# Patient Record
Sex: FEMALE | Race: WHITE | Employment: FULL TIME | RURAL
[De-identification: names, ages, dates, MRNs, and addresses within clinical notes are randomized per-mention and may not be internally consistent; named-entity substitution may affect disease eponyms.]

---

## 2022-02-28 ENCOUNTER — HOSPITAL ENCOUNTER (EMERGENCY)
Age: 62
Discharge: HOME | End: 2022-02-28
Attending: EMERGENCY MEDICINE
Payer: COMMERCIAL

## 2022-02-28 VITALS
WEIGHT: 158 LBS | HEIGHT: 65 IN | TEMPERATURE: 98.8 F | DIASTOLIC BLOOD PRESSURE: 68 MMHG | RESPIRATION RATE: 16 BRPM | OXYGEN SATURATION: 100 % | HEART RATE: 80 BPM | BODY MASS INDEX: 26.33 KG/M2 | SYSTOLIC BLOOD PRESSURE: 144 MMHG

## 2022-02-28 DIAGNOSIS — S00.411A ABRASION OF RIGHT EAR CANAL, INITIAL ENCOUNTER: Primary | ICD-10-CM

## 2022-02-28 PROCEDURE — 99282 EMERGENCY DEPT VISIT SF MDM: CPT

## 2022-02-28 PROCEDURE — 74011000250 HC RX REV CODE- 250: Performed by: EMERGENCY MEDICINE

## 2022-02-28 RX ORDER — CLOPIDOGREL BISULFATE 75 MG/1
TABLET ORAL
COMMUNITY

## 2022-02-28 RX ORDER — ROSUVASTATIN CALCIUM 5 MG/1
5 TABLET, COATED ORAL
COMMUNITY

## 2022-02-28 RX ORDER — LOSARTAN POTASSIUM 50 MG/1
TABLET ORAL DAILY
COMMUNITY

## 2022-02-28 RX ORDER — SILVER NITRATE 38.21; 12.74 MG/1; MG/1
1 STICK TOPICAL
Status: COMPLETED | OUTPATIENT
Start: 2022-02-28 | End: 2022-02-28

## 2022-02-28 RX ORDER — VENLAFAXINE 75 MG/1
75 TABLET ORAL DAILY
COMMUNITY

## 2022-02-28 RX ORDER — AMLODIPINE BESYLATE 5 MG/1
5 TABLET ORAL DAILY
COMMUNITY

## 2022-02-28 RX ADMIN — Medication 1 APPLICATOR: at 08:32

## 2022-02-28 NOTE — DISCHARGE INSTRUCTIONS
You had a tiny, bleeding abrasion in your right ear canal.  We used silver nitrate to stop the bleeding. If you notice bright red blood dripping from your ear again, please return to the emergency department for a further assessment.     Your ear will take several days to heal.  Please follow-up with your primary care doctor or ENT for a repeat exam in approximately 2 weeks to ensure the abrasion has healed well and there are no lesions or concerning issues in the canal.

## 2022-02-28 NOTE — ED TRIAGE NOTES
Pt arrived by POV with complaint of right ear bleeding. Pt reports she cleaned her ears yesterday and around midnight noted her right ear was bleeding, pt is on Plavix.   Pt is awake alert and oriented x 4, pt educated on ER flow

## 2022-02-28 NOTE — ED PROVIDER NOTES
EMERGENCY DEPARTMENT HISTORY AND PHYSICAL EXAM          Date: 2/28/2022  Patient Name: Seymour Nicolas    History of Presenting Illness     Chief Complaint   Patient presents with    Ear Pain     bleeding from the right ear       History Provided By: Patient    HPI: Seymour Nicolas is a 59 y.o. female, pmhx listed below, who presents to the ED c/o bleeding from right ear. Patient reports she woke up in the middle of the night and felt moisture on her pillow, realized that she was bleeding from her ear. Bright red blood. Has continued to note a small amount of blood coming from the ear throughout the morning. No injuries to ear. No history of similar symptoms. No ear pain. Last use a Q-tip yesterday morning. No treatments for symptoms prior to arrival.  Patient is on Plavix. PCP: Frannie Terry MD    There are no other complaints, changes, or physical findings at this time. Past History       Past Medical History:  Past Medical History:   Diagnosis Date    Hypertension     Ill-defined condition     firbromuscular dysplasia    Stroke ([facility] [address]     dec 2019       Past Surgical History:  Past Surgical History:   Procedure Laterality Date    HX GYN      hysterectomy 2010    HX ORTHOPAEDIC      spinal surgery L4 and L5 screws and plate       Family History:  History reviewed. No pertinent family history. Social History:  Social History     Tobacco Use    Smoking status: Never Smoker    Smokeless tobacco: Never Used   Vaping Use    Vaping Use: Never used   Substance Use Topics    Alcohol use: Yes     Comment: occasional    Drug use: Never       Current Outpatient Medications   Medication Sig Dispense Refill    clopidogreL (Plavix) 75 mg tab Take  by mouth.  amLODIPine (NORVASC) 5 mg tablet Take 5 mg by mouth daily.  venlafaxine (EFFEXOR) 75 mg tablet Take 75 mg by mouth daily.  losartan (COZAAR) 50 mg tablet Take  by mouth daily.       rosuvastatin (CRESTOR) 5 mg tablet Take 5 mg by mouth nightly. Allergies: Allergies   Allergen Reactions    Penicillins Rash         Review of Systems   Review of Systems   Constitutional: Negative for chills and fever. HENT: Negative for congestion. Eyes: Negative for pain. Respiratory: Negative for shortness of breath. Cardiovascular: Negative for chest pain. Gastrointestinal: Negative for abdominal pain. Genitourinary: Negative for flank pain. Musculoskeletal: Negative for back pain. Neurological: Negative for headaches. Psychiatric/Behavioral: Negative for agitation. Physical Exam     Vital Signs-Reviewed the patient's vital signs. Patient Vitals for the past 12 hrs:   Temp Pulse Resp BP SpO2   02/28/22 0822 98.8 °F (37.1 °C) 90 18 (!) 148/87 100 %       Physical Exam  Constitutional:       Appearance: Normal appearance. HENT:      Head: Normocephalic and atraumatic. Ears:      Comments: L ear canal normal, normal TM. R ear canal with superficial abrasion to anterior wall of canal with slow oozing of bright red blood from abrasion. R TM intact. Mouth/Throat:      Mouth: Mucous membranes are moist.   Cardiovascular:      Rate and Rhythm: Normal rate. Pulmonary:      Effort: Pulmonary effort is normal.   Skin:     General: Skin is warm and dry. Neurological:      Mental Status: She is alert and oriented to person, place, and time. Psychiatric:         Mood and Affect: Mood normal.         Diagnostic Study Results     Labs -   No results found for this or any previous visit (from the past 12 hour(s)). Radiologic Studies -   No orders to display     CT Results  (Last 48 hours)    None        CXR Results  (Last 48 hours)    None          Medical Decision Making   I am the first provider for this patient. I reviewed the vital signs, available nursing notes, past medical history, past surgical history, family history and social history.     Records Reviewed: Nursing Notes and Old Medical Records    Provider Notes (Medical Decision Making):   MDM: Ear cleaned gently with a Q-tip and a small clot was removed from canal revealing tiny abrasion with oozing of blood. Silver nitrate applied and bleeding controlled. Will observe in the emergency department for 10 to 15 minutes. If no rebleeding, will discharge home and recommend nothing in ear (no Q-tips) as well as follow-up with ENT for reassessment of area once healed. Lives in [place], will follow up with us to her home. Initial assessment performed. The patients presenting problems have been discussed, and they are in agreement with the care plan formulated and outlined with them. I have encouraged them to ask questions as they arise throughout their visit. PROGRESS NOTE:    9:00 AM No further bleeding. Will discharge home. Domenica return to ED precautions and follow-up plan. Discharge note: Will follow up as instructed. All questions have been answered, pt voiced understanding and agreement with plan. Specific return precautions provided as well as instructions to return to the ED should sx worsen at any time. Vital signs stable for discharge. Diagnosis     Clinical Impression:   1. Abrasion of right ear canal, initial encounter            Disposition:  Discharged    Current Discharge Medication List            Please note, this dictation was completed with Look[employer], the computer voice recognition software. Quite often unanticipated grammatical, syntax, homophones, and other interpretive errors are inadvertently transcribed by the computer software. Please disregard these errors. Please excuse any errors that have escaped final proof reading.

## 2023-05-14 RX ORDER — LOSARTAN POTASSIUM 50 MG/1
TABLET ORAL DAILY
COMMUNITY

## 2023-05-14 RX ORDER — CLOPIDOGREL BISULFATE 75 MG/1
TABLET ORAL
COMMUNITY

## 2023-05-14 RX ORDER — VENLAFAXINE 75 MG/1
75 TABLET ORAL DAILY
COMMUNITY

## 2023-05-14 RX ORDER — ROSUVASTATIN CALCIUM 5 MG/1
5 TABLET, COATED ORAL NIGHTLY
COMMUNITY

## 2023-05-14 RX ORDER — AMLODIPINE BESYLATE 5 MG/1
5 TABLET ORAL DAILY
COMMUNITY